# Patient Record
Sex: FEMALE | Race: WHITE
[De-identification: names, ages, dates, MRNs, and addresses within clinical notes are randomized per-mention and may not be internally consistent; named-entity substitution may affect disease eponyms.]

---

## 2018-11-07 ENCOUNTER — HOSPITAL ENCOUNTER (EMERGENCY)
Dept: HOSPITAL 14 - H.ER | Age: 63
Discharge: HOME | End: 2018-11-07
Payer: COMMERCIAL

## 2018-11-07 VITALS — DIASTOLIC BLOOD PRESSURE: 96 MMHG | SYSTOLIC BLOOD PRESSURE: 167 MMHG

## 2018-11-07 VITALS — TEMPERATURE: 98.4 F | OXYGEN SATURATION: 97 % | RESPIRATION RATE: 20 BRPM

## 2018-11-07 VITALS — HEART RATE: 88 BPM

## 2018-11-07 DIAGNOSIS — R05: ICD-10-CM

## 2018-11-07 DIAGNOSIS — Z79.899: ICD-10-CM

## 2018-11-07 DIAGNOSIS — E78.00: ICD-10-CM

## 2018-11-07 DIAGNOSIS — J45.901: ICD-10-CM

## 2018-11-07 DIAGNOSIS — R74.8: Primary | ICD-10-CM

## 2018-11-07 DIAGNOSIS — I10: ICD-10-CM

## 2018-11-07 DIAGNOSIS — Z88.0: ICD-10-CM

## 2018-11-07 LAB
ALBUMIN SERPL-MCNC: 3.7 G/DL (ref 3.5–5)
ALBUMIN/GLOB SERPL: 1 {RATIO} (ref 1–2.1)
ALT SERPL-CCNC: 28 U/L (ref 9–52)
APTT BLD: 33 SECONDS (ref 25.6–37.1)
AST SERPL-CCNC: 25 U/L (ref 14–36)
BASOPHILS # BLD AUTO: 0.1 K/UL (ref 0–0.2)
BASOPHILS NFR BLD: 0.8 % (ref 0–2)
BNP SERPL-MCNC: 60.9 PG/ML (ref 0–900)
BUN SERPL-MCNC: 33 MG/DL (ref 7–17)
CALCIUM SERPL-MCNC: 9.1 MG/DL (ref 8.4–10.2)
EOSINOPHIL # BLD AUTO: 0.2 K/UL (ref 0–0.7)
EOSINOPHIL NFR BLD: 2.9 % (ref 0–4)
ERYTHROCYTE [DISTWIDTH] IN BLOOD BY AUTOMATED COUNT: 14.4 % (ref 11.5–14.5)
GFR NON-AFRICAN AMERICAN: 45
HGB BLD-MCNC: 12 G/DL (ref 12–16)
INR PPP: 1.1
LIPASE SERPL-CCNC: 603 U/L (ref 23–300)
LYMPHOCYTES # BLD AUTO: 1.3 K/UL (ref 1–4.3)
LYMPHOCYTES NFR BLD AUTO: 16.5 % (ref 20–40)
MCH RBC QN AUTO: 30 PG (ref 27–31)
MCHC RBC AUTO-ENTMCNC: 32.8 G/DL (ref 33–37)
MCV RBC AUTO: 91.5 FL (ref 81–99)
MONOCYTES # BLD: 0.9 K/UL (ref 0–0.8)
MONOCYTES NFR BLD: 11.4 % (ref 0–10)
NEUTROPHILS # BLD: 5.5 K/UL (ref 1.8–7)
NEUTROPHILS NFR BLD AUTO: 68.4 % (ref 50–75)
NRBC BLD AUTO-RTO: 0 % (ref 0–0)
PLATELET # BLD: 222 K/UL (ref 130–400)
PMV BLD AUTO: 9.3 FL (ref 7.2–11.7)
PROTHROMBIN TIME: 12 SECONDS (ref 9.8–13.1)
RBC # BLD AUTO: 4.01 MIL/UL (ref 3.8–5.2)
WBC # BLD AUTO: 8.1 K/UL (ref 4.8–10.8)

## 2018-11-07 NOTE — ED PDOC
HPI: Chest Pain


Time Seen by Provider: 11/07/18 16:01


Chief Complaint (Nursing): Chest Pain


Chief Complaint (Provider): Chest Pain


History Per: Patient


History/Exam Limitations: no limitations


Onset/Duration Of Symptoms: Days (x1), Intermittent Episodes


Current Symptoms Are (Timing): Still Present


Quality: Tightness


Additional Complaint(s): 


Rosemary Quigley is a 63 year old female with a past medical history of 

hypertension, asthma, and hypercholesterolemia who is presenting to the ED for 

evaluation of intermittent chest pain onset yesterday. Patient states that the 

chest pain is most of the time exclusively when she is coughing and reports that

she has had a dry cough for the past 3 days. She reports a 7/10 chest tightness 

and admits that she forgot to take her blood pressure medications today. She 

notes a history of asthma but adds that she is unsure if the current symptoms 

are related to the asthma. Patient denies any fevers, shortness of breath, leg 

swelling, arm or jaw pain, prolonged immobility, orthopnea, abdominal pain, N/V/

D, or calf pain. 





Of note, patient states that she has never been intubated and has not been on 

steroids in years. 


PMD: Pallavi Fried





Past Medical History


Reviewed: Historical Data, Nursing Documentation, Vital Signs


Vital Signs: 





                                Last Vital Signs











Temp  98.4 F   11/07/18 15:40


 


Pulse  82   11/07/18 15:40


 


Resp  20   11/07/18 15:40


 


BP  167/96 H  11/07/18 16:01


 


Pulse Ox  97   11/07/18 15:40














- Medical History


PMH: Asthma, HTN, Hypercholesterolemia





- Surgical History


Other surgeries: gastric band surgery





- Family History


Family History: States: Unknown Family Hx





- Social History


Current smoker - smoking cessation education provided: No


Alcohol: None


Drugs: Denies





- Home Medications


Home Medications: 


                                Ambulatory Orders











 Medication  Instructions  Recorded


 


Albuterol Sulfate [Ventolin Hfa] 1 puff IH Q4 PRN #1 unit 11/07/18


 


RX: Prednisone [Deltasone] 40 mg PO DAILY #8 tablet 11/07/18














- Allergies


Allergies/Adverse Reactions: 


                                    Allergies











Allergy/AdvReac Type Severity Reaction Status Date / Time


 


Penicillins Allergy  RASH Verified 11/07/18 15:39














Review of Systems


ROS Statement: Except As Marked, All Systems Reviewed And Found Negative


Constitutional: Negative for: Fever


Cardiovascular: Positive for: Chest Pain


Respiratory: Positive for: Cough.  Negative for: Shortness of Breath, Other 

(orthopnea )


Gastrointestinal: Negative for: Nausea, Vomiting, Abdominal Pain, Diarrhea


Musculoskeletal: Negative for: Arm Pain, Leg Pain, Other (jaw pain)





Physical Exam





- Reviewed


Nursing Documentation Reviewed: Yes


Vital Signs Reviewed: Yes





- Physical Exam


Comments: 


GENERAL APPEARANCE: Patient is awake, alert, oriented x 3, in no acute distress.

 Resting comfortably. 


SKIN:  Warm, dry; (-) cyanosis.


EYES:  (-) conjunctival pallor, (-) scleral icterus.


ENMT:  Mucous membranes moist.


NECK: Supple, FROM


CHEST AND RESPIRATORY: (+) reproducible anterior chest wall tenderness  (-) 

rales, (-) rhonchi, (+) faint inspiratory wheezing to lower lung fields; (+) 

decreased breath sounds bilaterally. Speaking in full sentences, respirations 

nonlabored.


HEART AND CARDIOVASCULAR:  (-) irregularity


ABDOMEN AND GI: Soft (-) distention (-) tenderness (-) guarding, (-) rebound, (-

) palpable masses, (-) CVA tenderness.


EXTREMITIES:  (-) deformity, (-) edema, (+) distal pulses.


NEURO AND PSYCH:  Mental status as above; (-) focal findings. Gait; Steady. 

Speech: clear. (-) facial asymmetry





- Laboratory Results


Result Diagrams: 


                                 11/07/18 17:28





                                 11/07/18 17:28





- ECG


ECG Rhythm: Positive for: Normal ST Segment, Sinus Rhythm (normal ), Right 

Bundle Branch Block


Interpretation Of ECG: 








Rate: 88


O2 Sat by Pulse Oximetry: 97 (RA)


Pulse Ox Interpretation: Normal





Medical Decision Making


Medical Decision Making: 


Time: 16:24


Impression: cough, chest pain


Plan:


--B-Type Natriuretic Peptide


--CMP


--Lipase


--Troponin


--CBC


--Coags


--Chest X-Ray


--Albuterol 2.5 mg INH


--Duoneb 3 ml INH


--Prednisone Tab 60 mg PO





CXR reviewed


Date of service: 


11/07/2018


HISTORY:


 chest pain, cough 


COMPARISON:


No prior.


TECHNIQUE:


Chest PA and lateral


FINDINGS:


LUNGS:


Poor inspiration with low lung volumes, crowded bronchovascular markings and 

mild bibasilar atelectasis. 


PLEURA:


No significant pleural effusion identified. No pneumothorax apparent.


CARDIOVASCULAR:


Mild aortic atherosclerotic calcification present.


Normal heart appears enlarged..  No pulmonary vascular congestion. 


OSSEOUS STRUCTURES:


No significant abnormalities.


VISUALIZED UPPER ABDOMEN:


Normal.


OTHER FINDINGS:


None.


IMPRESSION:


Poor inspiration with low lung volumes, crowded bronchovascular markings and 

mild bibasilar atelectasis.





Labs reviewed. Lipase elevated. Strict return precautions given for patient if 

GI symptoms develop. Liquid diet encouraged.


Troponin and BNP: WNL.


On re-evaluation, patient reports improvement of symptoms. On exam, patient 

remains AAOx3, in no acute distress. Lungs clear to auscultation, cardiac RRR, 

abdomen soft, non-tender, repeat neuro exam shows no focal findings.  VSS, 

stable for discharge.


Lab/Diagnostic results d/w the patient in great detail. Diagnosis of asthma 

exacerbation, chest tightness, elevated lipase d/w the patient. 


Based on history, exam and diagnostic results, plan will be for outpatient 

follow up with PMD/GI. 


Patient instructed to follow-up with pmd / referral provided / the clinic  in 1-

2 days without fail. Advised to take medication as prescribed. Return to the 

emergency room at any time for any new or worsening symptoms. Patient states she

 fully agrees with and understands discharge instructions. States that she 

agrees with the plan and disposition. Verbalized and repeated discharge 

instructions and plan. I have given the patient opportunity to ask any 

additional questions.


----------------------------------------------------------------------

---------------------------


Scribe Attestation:   


Documented by Vanessa Ramirez, acting as a scribe for Sofía Casper PA-C.





Provider Scribe Attestation:


All medical record entries made by the Scribe were at my direction and 

personally dictated by me. I have reviewed the chart and agree that the record 

accurately reflects my personal performance of the history, physical exam, 

medical decision making, and the department course for this patient. I have also

 personally directed, reviewed, and agree with the discharge instructions and 

disposition.











Disposition





- Clinical Impression


Clinical Impression: 


 Elevated lipase, Cough, Asthma exacerbation








- Patient ED Disposition


Is Patient to be Admitted: No


Counseled Patient/Family Regarding: Studies Performed, Diagnosis, Need For 

Followup, Rx Given





- Disposition


Referrals: 


primary, doctor [Other]


Nicolás Mcgee MD, PhD [Staff Provider] - 


Disposition: Routine/Home


Disposition Time: 19:50


Condition: STABLE


Additional Instructions: 


PLEASE RETURN TO ED IMMEDIATELY IF YOU DEVELOP ABDOMINAL PAIN, NAUSEA, VOMITING.

LIQUID DIET ENCOURAGED FOR THE NEXT 24-48 HOURS.FOLLOW UP WITH GI FOR FURTHER 

EVALUATION.





The emergency medical care you received today was directed towards the acute 

presenting symptoms. If you were prescribed any medication, please fill it and 

give as directed. It may take several days for your symptoms to resolve. Return 

to the Emergency Department at any time if symptoms worsen, do not improve, or 

if any other problems arise.





Please contact your doctor in 2 days for re-evaluation and follow up / or call 

one of the physicians/clinics you have been referred to that are listed on the 

Patient Visit Information form that is included in your discharge packet. Bring 

any paperwork you were given at discharge with you along with any medications to

your follow up visit. Our treatment cannot replace ongoing medical care by a 

primary care provider (PCP) outside of the emergency department.


Prescriptions: 


Albuterol Sulfate [Ventolin Hfa] 1 puff IH Q4 PRN #1 unit


 PRN Reason: Cough


RX: Prednisone [Deltasone] 40 mg PO DAILY #8 tablet


Instructions:  Pancreatitis, Asthma in Adults, Cough, Adult (DC), Lipase Blood 

Test


Forms:  [facility] (English)


Print Language: ENGLISH





- POA


Present On Arrival: None





Results





- Lab Results


Lab Results: 

















  11/07/18 11/07/18 11/07/18





  17:28 17:28 17:28


 


WBC   8.1 


 


RBC   4.01 


 


Hgb   12.0 


 


Hct   36.6 


 


MCV   91.5 


 


MCH   30.0 


 


MCHC   32.8 L 


 


RDW   14.4 


 


Plt Count   222 


 


MPV   9.3 


 


Neut % (Auto)   68.4 


 


Lymph % (Auto)   16.5 L 


 


Mono % (Auto)   11.4 H 


 


Eos % (Auto)   2.9 


 


Baso % (Auto)   0.8 


 


Neut # (Auto)   5.5 


 


Lymph # (Auto)   1.3 


 


Mono # (Auto)   0.9 H 


 


Eos # (Auto)   0.2 


 


Baso # (Auto)   0.1 


 


PT  12.0  


 


INR  1.1  


 


APTT  33.0  


 


Sodium    142


 


Potassium    4.3


 


Chloride    109 H


 


Carbon Dioxide    25


 


Anion Gap    12


 


BUN    33 H


 


Creatinine    1.2


 


Est GFR ( Amer)    55


 


Est GFR (Non-Af Amer)    45


 


Random Glucose    80


 


Calcium    9.1


 


Total Bilirubin    0.4


 


AST    25


 


ALT    28


 


Alkaline Phosphatase    65


 


Troponin I    < 0.0120


 


NT-Pro-B Natriuret Pep    60.9


 


Total Protein    7.5


 


Albumin    3.7


 


Globulin    3.8


 


Albumin/Globulin Ratio    1.0


 


Lipase    603 H

## 2018-11-07 NOTE — RAD
Date of service: 



11/07/2018



HISTORY:

 chest pain, cough 



COMPARISON:

No prior.



TECHNIQUE:

Chest PA and lateral



FINDINGS:



LUNGS:

Poor inspiration with low lung volumes, crowded bronchovascular 

markings and mild bibasilar atelectasis. 



PLEURA:

No significant pleural effusion identified. No pneumothorax apparent.



CARDIOVASCULAR:

Mild aortic atherosclerotic calcification present.



Normal heart appears enlarged..  No pulmonary vascular congestion. 



OSSEOUS STRUCTURES:

No significant abnormalities.



VISUALIZED UPPER ABDOMEN:

Normal.



OTHER FINDINGS:

None.



IMPRESSION:

Poor inspiration with low lung volumes, crowded bronchovascular 

markings and mild bibasilar atelectasis.